# Patient Record
Sex: FEMALE | Race: WHITE | NOT HISPANIC OR LATINO | URBAN - METROPOLITAN AREA
[De-identification: names, ages, dates, MRNs, and addresses within clinical notes are randomized per-mention and may not be internally consistent; named-entity substitution may affect disease eponyms.]

---

## 2021-04-29 ENCOUNTER — TRANSFERRED RECORDS (OUTPATIENT)
Dept: HEALTH INFORMATION MANAGEMENT | Facility: CLINIC | Age: 36
End: 2021-04-29

## 2021-05-05 ENCOUNTER — TRANSFERRED RECORDS (OUTPATIENT)
Dept: HEALTH INFORMATION MANAGEMENT | Facility: CLINIC | Age: 36
End: 2021-05-05

## 2021-05-05 ENCOUNTER — TELEPHONE (OUTPATIENT)
Dept: NEUROSURGERY | Facility: CLINIC | Age: 36
End: 2021-05-05

## 2021-05-05 NOTE — TELEPHONE ENCOUNTER
[facility] Call Center    Phone Message    May a detailed message be left on voicemail: yes     Reason for Call: Other: Patient calling to schedule an appointment with Dr. Bonilla. Patient states in tears that she is very worried and would like to be seen ASAP.     Patient is also wondering if clinic has received the referral and MRI results from [facility].      Please advise and call patient back at your earliest convenience to discuss scheduling options     Action Taken: Other: [facility] NEUROSURGERY     Travel Screening: Not Applicable

## 2021-05-05 NOTE — TELEPHONE ENCOUNTER
Health Call Center    Phone Message    May a detailed message be left on voicemail: yes     Reason for Call: Other: Ritika from [facility] called to check on the status of getting Pt shceduled with Dr. 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  She states she faxed over referral this morning and that Dr. Soares says this is urgent and has had a conversation with Dr. Bonilla regarding this Pt.    Please call Sandra vega ASA to schedule appt.    Action Taken: Message routed to:  [facility] ([facility] Neurology    Travel Screening: Not Applicable

## 2021-05-06 ENCOUNTER — TRANSCRIBE ORDERS (OUTPATIENT)
Dept: OTHER | Age: 36
End: 2021-05-06

## 2021-05-06 ENCOUNTER — CARE COORDINATION (OUTPATIENT)
Dept: NEUROSURGERY | Facility: CLINIC | Age: 36
End: 2021-05-06

## 2021-05-06 DIAGNOSIS — C41.2 CHORDOMA (H): Primary | ICD-10-CM

## 2021-05-06 NOTE — PROGRESS NOTES
Marisolr returned call to Sandra Lara, Admin for Dr. Soares.      Sandra calling on behalf of the patient and MD to coordinate patient care.    Dr. Soares and patient upset, that appointment has not been made with Dr. Bonilla and information not received.     Per Sandra, Dr. Horan had a  provider to provider consult with Dr. Bonilla, with urgent findings.   Imaging was pushed to [facility].   Referral and records faxed.    Sandra asking to speak with the manager.     Writer informed Sandra she would assist with coordination of care for patient.     Marisolr informed Sandra, [facility] imaging has been received in our system.   Provided the fax number to Neurosurgery to re-fax referral records.(  does not have access to fax number provided to Sandra.)  Provided direct call back number.      will coordinate with Dr. Chad Flores appointment for urgent findings per provider to provider consult.     Triny Garcia LPN  Neurosurgery

## 2021-05-06 NOTE — TELEPHONE ENCOUNTER
Writer returned call to MD office.   Note Epic documentation 05/06/2021     Triny Garcia LPN  Neurosurgery

## 2021-05-06 NOTE — TELEPHONE ENCOUNTER
Health Call Center    Phone Message    May a detailed message be left on voicemail: yes     Reason for Call: Other: A referral from Dr. Teresa was received today to further investigate the possibility of chordoma. Writer was not able to locate the imaging that was sent from [facility]. Demetrice is requesting a call be made to Sandra (Dr. Soares's ) to discuss if she is needing to fax the records over again at [phone].    Action Taken: Message routed to:  [facility] ([facility]): [facility]ERY    Travel Screening: Not Applicable

## 2021-05-06 NOTE — PROGRESS NOTES
Writer call Neurosurgery Clinic Scheduling. Spoke with Lucy WHARTON With patient background for scheduling with Dr. Bonilla.   Lucy WHARTONto contact writer regarding appointment.   Marisolr followed up via e-mail with referring provider admin to update on the above.     Triny Garcia LPN  Neurosurgery

## 2021-05-07 ENCOUNTER — PRE VISIT (OUTPATIENT)
Dept: NEUROSURGERY | Facility: CLINIC | Age: 36
End: 2021-05-07

## 2021-05-07 NOTE — PROGRESS NOTES
Writer contacted Dr. Bonilla to confirm date of appointment and imaging order faxed to referring provider.   Coordination of care with Sandra from Dr. Soares's clinic for appointment with Dr. Bonilla.   MRI to be completed prior to 05/12/2021 Dr. Bonilla appointment.     Patient to have imaging at [facility] Monday 05/11/2021.   Imaging to be pushed to [facility], resolved to MRN and report faxed.      Writer to inform Dr. Bonilla when imaging has been resolved.   Writer to inform Dr. Soares's office all imagine received for review.     Triny Garcia LPN  Neurosurger

## 2021-05-07 NOTE — TELEPHONE ENCOUNTER
FUTURE VISIT INFORMATION      FUTURE VISIT INFORMATION:    Date: 5/12/2021    Time: 1230pm    Location: [facility]  REFERRAL INFORMATION:    Referring provider:  Dr. Soares    Referring providers clinic:  [facility]     Reason for visit/diagnosis  Chordoma     RECORDS REQUESTED FROM:       Clinic name Comments Records Status Imaging Status   [facility] MR Brain-4/29/2021    MR Cervical Spine-4/29/2021 Requested reports PACS                                   5/7/2021-Request for reports to be faxed sent to [facility]-MR @ 618am    5/8/2021-[facility] Reports scanned to Chart-MR @ 503am    5/11/2021-[facility] reports scanned to Chart-MR @ 550am

## 2021-05-10 ENCOUNTER — CARE COORDINATION (OUTPATIENT)
Dept: NEUROSURGERY | Facility: CLINIC | Age: 36
End: 2021-05-10

## 2021-05-10 NOTE — PROGRESS NOTES
Marisolr received message from Lucy WOLFE in Neurosurgery Clinic Scheduling; Dr. Bonilla approved for patient to be seen in clinic on 05/12/2021.   Writer sent e-mail to Sandra from referring provider to inform patient of appointment in clinic/in person with Dr. Bonilla.     Note: Coordination of care per patient to have one point person. Sandra gregg MD office point person for patient.     Triny Garcia LPN  Neurosurgery

## 2021-05-10 NOTE — PROGRESS NOTES
Writer sent e-mail to Sandra from MD office to inform imaging received and Dr. Bonilla notified imaging in [facility].     Triny Garcia LPN  Neurosurgery

## 2021-05-12 ENCOUNTER — OFFICE VISIT (OUTPATIENT)
Dept: NEUROSURGERY | Facility: CLINIC | Age: 36
End: 2021-05-12
Payer: COMMERCIAL

## 2021-05-12 VITALS
BODY MASS INDEX: 17.27 KG/M2 | OXYGEN SATURATION: 100 % | SYSTOLIC BLOOD PRESSURE: 111 MMHG | WEIGHT: 110 LBS | RESPIRATION RATE: 16 BRPM | HEIGHT: 67 IN | HEART RATE: 65 BPM | DIASTOLIC BLOOD PRESSURE: 67 MMHG

## 2021-05-12 DIAGNOSIS — G93.0 CYST OF POSTERIOR CRANIAL FOSSA: Primary | ICD-10-CM

## 2021-05-12 PROCEDURE — 99207 PR NO DOCUMENTATION ON VISIT: CPT | Performed by: NEUROLOGICAL SURGERY

## 2021-05-12 ASSESSMENT — MIFFLIN-ST. JEOR: SCORE: 1221.59

## 2021-05-12 ASSESSMENT — PAIN SCALES - GENERAL: PAINLEVEL: NO PAIN (0)

## 2021-05-12 NOTE — PATIENT INSTRUCTIONS
Follow up with Dr Bonilla in clinic for 6 months with MRI Brain prior.  MRI Brain @ [facility] in [facility].    Call Keshia RN for questions/concerns     Thank you for using QVPN
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
## 2021-05-12 NOTE — LETTER
5/12/2021       RE: Demetrice Perkins  [address] [address] [address]     Dear Colleague,    Thank you for referring your patient, Demetrice Perkins, to the [facility] NEUROSURGERY CLINIC [facility]. Please see a copy of my visit note below.    See dictated note. Visit 30 minutes.  SAAD Bonilla MD      Again, thank you for allowing me to participate in the care of your patient.      Sincerely,    Norm Bonilla MD

## 2021-05-13 ENCOUNTER — TELEPHONE (OUTPATIENT)
Dept: NEUROSURGERY | Facility: CLINIC | Age: 36
End: 2021-05-13
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
## 2021-05-18 ENCOUNTER — TELEPHONE (OUTPATIENT)
Dept: NEUROSURGERY | Facility: CLINIC | Age: 36
End: 2021-05-18

## 2021-05-18 DIAGNOSIS — G93.0 CYST OF POSTERIOR CRANIAL FOSSA: Primary | ICD-10-CM

## 2021-05-18 NOTE — TELEPHONE ENCOUNTER
Spoke with Demetrice.  Demetrice wondering if MRI Brain should be done every 2-3 months to monitor brain mass.     Dr Bonilla said to repeat in 6 months.  MRI isn't needed and exposure not needed too.  Patient voices understanding.    Patient has my name and number for any new symptoms or symptoms that worsen to call and can be reviewed earlier with Dr LEI.    Patient voices understanding.  Request MRI Brain be completed at [facility] in [facility].    Order faxed.  Next appointment with Dr LEI already set up for 11/9/21.    Voices understanding.

## 2021-05-18 NOTE — TELEPHONE ENCOUNTER
[facility] Call Center    Phone Message    May a detailed message be left on voicemail: yes     Reason for Call: Other: pt question:  Would Dr. Bonilla consider doing MRI every 3 mos to alleviate the anxiety that pt is having, please?  Please review and call pt to let her know.  Pt would like it monitored more closely in this first year to make sure there are no changes.     Action Taken: Message routed to:  [facility] ([facility]): [facility]    Travel Screening: Not Applicable

## 2021-10-28 ENCOUNTER — TRANSFERRED RECORDS (OUTPATIENT)
Dept: HEALTH INFORMATION MANAGEMENT | Facility: CLINIC | Age: 36
End: 2021-10-28
Payer: COMMERCIAL

## 2021-12-14 ENCOUNTER — VIRTUAL VISIT (OUTPATIENT)
Dept: NEUROSURGERY | Facility: CLINIC | Age: 36
End: 2021-12-14
Payer: COMMERCIAL

## 2021-12-14 DIAGNOSIS — M89.8X8 SKULL MASS: Primary | ICD-10-CM

## 2021-12-14 PROCEDURE — 99213 OFFICE O/P EST LOW 20 MIN: CPT | Mod: GT | Performed by: NEUROLOGICAL SURGERY

## 2021-12-14 NOTE — PROGRESS NOTES
Demetrice is a 36 year old who is being evaluated via a billable video visit.      How would you like to obtain your AVS? MyChart  If the video visit is dropped, the invitation should be resent by: Send to e-mail at: [email or web address]  Will anyone else be joining your video visit? No      Video Start Time: 13:45  Video-Visit Details    Type of service:  Video Visit    Video End Time: 1400    Originating Location (pt. Location): Home    Distant Location (provider location):  [facility] NEUROSURGERY [facility]     Platform used for Video Visit: AmWell      COVID + last week. Anosmia. Deep chest pain probably due to cough. No fevers currently. Had a fever last week. Otherwise had been feeling well. Concussion symptoms resolved. Repeat MRI brain without contrast at [facility] in 2 years.  SAAD Bonilla MD

## 2021-12-14 NOTE — LETTER
12/14/2021       RE: Demetrice Perkins  [address] [address] [address]     Dear Colleague,    Thank you for referring your patient, Demetrice Perkins, to the [facility] NEUROSURGERY CLINIC [facility]. Please see a copy of my visit note below.      COVID + last week. Anosmia. Deep chest pain probably due to cough. No fevers currently. Had a fever last week. Otherwise had been feeling well. Concussion symptoms resolved. Repeat MRI brain without contrast at [facility] in 2 years.      SAAD Bonilla MD

## 2021-12-14 NOTE — LETTER
12/14/2021      RE: Demetrice Perkins  [address] [facility] [address] [address]       COVID + last week. Anosmia. Deep chest pain probably due to cough. No fevers currently. Had a fever last week. Otherwise had been feeling well. Concussion symptoms resolved. Repeat MRI brain without contrast at [facility] in 2 years.    SAAD Bonilla MD

## 2021-12-29 NOTE — PATIENT INSTRUCTIONS
Repeat MRI brain without contrast at [facility] in [place] in two years (not ordered)    Follow up by video or phone after MRI

## 2021-12-29 NOTE — PROGRESS NOTES
Service Date: 2021    MD Piero Villar [facility] [place]  [address], [address]  [address]    RE:  Demetrice Perkins  MRN:  [id number]  :  1985    Dear Dr. Shea:    We saw Ms. Perkins back in [facility] on 2021 as part of a virtual video visit.  Please see our note from earlier this year for complete details.  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  She was feeling well until about last week when she tested positive for COVID.  She has some of the typical symptoms of anosmia and fever.  Her fevers have resolved now.  She still has chest pain, probably due to persistent coughing. From a neurological standpoint, she has been doing well.  All of her postconcussive symptoms resolved and she denies any headaches.  She denies any visual changes.    Over the video platform, her gross neurological examination seems to be normal.    REVIEW OF STUDIES:  We reviewed the images from her MRI from 10/28/2021 and compared to the previous study.  We showed her the developmental clival canal, which is best seen on the sagittal views.  We pointed out the unchanged appearance and she seemed to understand all of these findings.    IMPRESSION AND PLAN:  We reassured her that this finding should be of no clinical concern.  Because of her young age, it will be worthwhile to follow her with MRI, though less frequently.  We agreed on repeating an MRI without contrast in 2 years and this can be done at [facility] in [place].  She will notify us if she is having any problems in the interim.  Please do not hesitate to contact us with questions.    Sincerely,    Norm Bonilla MD        D: 2021   T: 2021   MT: stan    Name:     DEMETRICE PERKINS  MRN:      [id number]        Account:      [id number]   :      1985           Service Date: 2021       Document:  [id number]

## 2023-10-17 ENCOUNTER — TELEPHONE (OUTPATIENT)
Dept: NEUROSURGERY | Facility: CLINIC | Age: 38
End: 2023-10-17
Payer: COMMERCIAL

## 2023-10-17 DIAGNOSIS — R51.9 HEADACHE: Primary | ICD-10-CM

## 2023-10-17 NOTE — PROGRESS NOTES
Order entered for MRI per 12/14/21 note from Dr. Bonilla,   We agreed on repeating an MRI without contrast in 2 years and this can be done at [facility] in [place].   Jessica Lu RN 10/17/2023 12:15 PM

## 2023-10-18 ENCOUNTER — TELEPHONE (OUTPATIENT)
Dept: NEUROLOGY | Facility: CLINIC | Age: 38
End: 2023-10-18
Payer: COMMERCIAL

## 2023-10-18 NOTE — TELEPHONE ENCOUNTER
Called patient to discuss open MRI at [facility] and offer Valium prior to scan. Patient would like RN to confirm with Dr. Bonilla that it is necessary to go through with MRI. She was under impression from her last visit with him that it was up to her discretion if she would like to have MRI's or not. Let patient know I will touch base with him on Tuesday 10/24/23 and call her back next week with his recommendation.    Jessica Lu RN 10/18/2023 9:44 AM

## 2023-10-25 ENCOUNTER — DOCUMENTATION ONLY (OUTPATIENT)
Dept: NEUROSURGERY | Facility: CLINIC | Age: 38
End: 2023-10-25
Payer: COMMERCIAL

## 2023-10-25 NOTE — PROGRESS NOTES
MRI Brain order faxed to [facility] at fax# [phone] per OVIDIO WHARTON

## 2023-10-25 NOTE — TELEPHONE ENCOUNTER
Called patient to let her know I discussed with Dr. Bonilla and his response was that he would still like her to get the scan. Can do it open at [facility] and I can call in Valium to help ease anxiety. If this scan looks unchanged from last scan then we can determine that it will not change and can discontinue monitoring. patient states she would like to think about the Valium  and if she does decide that she wants it she will give us a call back. Order entered for MRI and requested Navigator team fax to [facility] so patient can call to schedule.   Jessica Lu RN 10/25/2023 9:28 AM

## 2023-10-26 ENCOUNTER — TELEPHONE (OUTPATIENT)
Dept: NEUROSURGERY | Facility: CLINIC | Age: 38
End: 2023-10-26
Payer: COMMERCIAL

## 2023-10-26 NOTE — TELEPHONE ENCOUNTER
----- Message from Jessica Lu RN sent at 10/26/2023  9:53 AM CDT -----  Regarding: RE: please fax  Thank you Damari Alberts, can you call patient and see where she would like it faxed for the open MRI?    Thanks!  Jessica    ----- Message -----  From: Krysten Green  Sent: 10/25/2023   5:42 PM CDT  To: Jessica Lu RN; Leigha Capone CMA; #  Subject: RE: please fax                                   Hi,  I think [facility] or [facility] has them.  Thanks,  Lucy  ----- Message -----  From: Jessica Lu RN  Sent: 10/25/2023  10:16 AM CDT  To: Leigha Capone CMA; Krysten Green; #  Subject: RE: please fax                                   Hi Aristeo,   I apologize for the confusion on this. Patient has not had it done at a location with an open MRI before. She had a lot of anxiety last time she had one done so she was eager to have it done not enclosed     Lucy, do you know which location does open imaging?    Thank you both for the help!  Jessica  ----- Message -----  From: Leigha Capone CMA  Sent: 10/25/2023   9:55 AM CDT  To: Jessica Lu RN; #  Subject: RE: please fax                                   I don't know if they have the open scanner I faxed it there since she always goes to [facility]... Do you know Which [place] [facility] location does she want to go too? I can fax it there too.   ----- Message -----  From: Jessica Lu RN  Sent: 10/25/2023   9:45 AM CDT  To: Leigha Capone CMA; #  Subject: RE: please fax                                   Hi Aristeo,     Just to confirm, they have the open scanner is that correct? And will they call her to schedule? I told her [facility] so she will need the phone number for this place if they are different.     Thanks!  Jessica    ----- Message -----  From: Leigha Capone CMA  Sent: 10/25/2023   9:38 AM CDT  To: Jessica Lu RN; #  Subject: RE: please fax                                   Gentry Lopez Good morning.  Order faxed to [facility] at fax# [phone].   ----- Message -----  From: Jessica Lu RN  Sent: 10/25/2023   9:30 AM CDT  To: Leigha Capone CMA; #  Subject: please fax                                       HI Aristeo,     Can you please fax the MRI w/o contrast to [facility] so the patient can do the open scanner please? If you can let me know when complete I would appreciate it!    Thank you!  Jessica

## 2023-10-26 NOTE — TELEPHONE ENCOUNTER
Spoke to patient she would like to go to [facility] [place].     Order faxed to [phone] per patient request. Patient aware and will call [facility] to scheduled MRI. No other questions or concerns at this time.

## 2023-11-29 ENCOUNTER — TRANSFERRED RECORDS (OUTPATIENT)
Dept: HEALTH INFORMATION MANAGEMENT | Facility: CLINIC | Age: 38
End: 2023-11-29
Payer: COMMERCIAL

## 2023-12-19 ENCOUNTER — OFFICE VISIT (OUTPATIENT)
Dept: NEUROSURGERY | Facility: CLINIC | Age: 38
End: 2023-12-19
Payer: COMMERCIAL

## 2023-12-19 VITALS
RESPIRATION RATE: 16 BRPM | OXYGEN SATURATION: 98 % | SYSTOLIC BLOOD PRESSURE: 105 MMHG | HEART RATE: 62 BPM | DIASTOLIC BLOOD PRESSURE: 69 MMHG

## 2023-12-19 DIAGNOSIS — Q75.9 CONGENITAL ANOMALY OF SKULL: Primary | ICD-10-CM

## 2023-12-19 PROCEDURE — 99213 OFFICE O/P EST LOW 20 MIN: CPT | Performed by: NEUROLOGICAL SURGERY

## 2023-12-19 ASSESSMENT — PAIN SCALES - GENERAL: PAINLEVEL: NO PAIN (0)

## 2023-12-19 NOTE — PROGRESS NOTES
"Date of service: 12/19/2023  Visit length: 20 minutes    VickSergey, DO   [address] [facility] [address]   [place] [address]       Dear Dr. Hickman:    We saw Mrs. 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  Please see our previous notes for complete details.  She was accompanied by her  on this visit.  In general, she feels well.  She is not having any significant headaches.  She was COVID-positive last year.  She describes ongoing \"brain fog.\"  Her olfaction has recovered.  Her sleep hygiene has been irregular.    On exam, her general appearance is healthy.  Blood pressure 105/69, heart rate 62  Gross neurological examination remains normal.    We reviewed the MRI from 11/29/2023 and compared it to previous studies.  This was a low field open MRI, and image resolution is not as good as previous studies. Once again, we see a cystic tract in the clivus without any associated bony remodeling or lytic changes.     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  We asked her to contact us if she develops any new problems in the future.  We discussed potential neuropsychological testing or a sleep study for further evaluation of her cognitive concerns.  We can help facilitate the studies if her symptoms worsen.  We will see her back as needed.  Not hesitate contact us with questions.    Sincerely,        Norm Bonilla MD  Department of Neurosurgery      "

## 2023-12-19 NOTE — LETTER
"12/19/2023       RE: Demetrice Perkins  [address] [facility] [id number]       Dear Colleague,    Thank you for referring your patient, Demetrice Perkins, to the [facility] NEUROSURGERY CLINIC [employer] [facility]. Please see a copy of my visit note below.    Date of service: 12/19/2023  Visit length: 20 minutes    Sergey Hickman, DO   [address]   [address]   [place] [address]       Dear Dr. Hickman:    We saw Mrs. 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  Please see our previous notes for complete details.  She was accompanied by her  on this visit.  In general, she feels well.  She is not having any significant headaches.  She was COVID-positive last year.  She describes ongoing \"brain fog.\"  Her olfaction has recovered.  Her sleep hygiene has been irregular.    On exam, her general appearance is healthy.  Blood pressure 105/69, heart rate 62  Gross neurological examination remains normal.    We reviewed the MRI from 11/29/2023 and compared it to previous studies.  This was a low field open MRI, and image resolution is not as good as previous studies. Once again, we see a cystic tract in the clivus without any associated bony remodeling or lytic changes.     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  We asked her to contact us if she develops any new problems in the future.  We discussed potential neuropsychological testing or a sleep study for further evaluation of her cognitive concerns.  We can help facilitate the studies if her symptoms worsen.  We will see her back as needed.  Not hesitate contact us with questions.        Again, thank you for allowing me to participate in the care of your patient.      Sincerely,    Norm Bonilla MD    "

## 2023-12-20 NOTE — PATIENT INSTRUCTIONS
Follow up as needed for any new symptoms or signs.    If your cognitive issues worsen, contact us to facilitate neuropsychological testing